# Patient Record
(demographics unavailable — no encounter records)

---

## 2025-05-28 NOTE — HISTORY OF PRESENT ILLNESS
[FreeTextEntry1] : CPE [de-identified] : GARY LORA is a 38 year F with PMHx of presenting for CPE.   Diet/exercise: Social history: Sexually active/Last menstrual period:  SPECIALISTS:

## 2025-05-28 NOTE — ASSESSMENT
[FreeTextEntry1] : HEALTH CARE MAINTENANCE - Influenza vaccine: - Covid vaccine: - HIV: - HEP C: - HBV: - TDAP: - Pap smear:  RTC in